# Patient Record
Sex: FEMALE | Race: WHITE | Employment: OTHER | URBAN - METROPOLITAN AREA
[De-identification: names, ages, dates, MRNs, and addresses within clinical notes are randomized per-mention and may not be internally consistent; named-entity substitution may affect disease eponyms.]

---

## 2018-08-23 ENCOUNTER — HOSPITAL ENCOUNTER (OUTPATIENT)
Dept: NON INVASIVE DIAGNOSTICS | Age: 83
Discharge: HOME | End: 2018-08-23
Payer: MEDICARE

## 2018-08-23 PROCEDURE — 93271 ECG/MONITORING AND ANALYSIS: CPT

## 2018-08-23 PROCEDURE — 93270 REMOTE 30 DAY ECG REV/REPORT: CPT

## 2018-08-27 NOTE — PROCEDURES
[address] [facility]                                   EVENT MONITOR    PATIENT NAME: Ritesh Martinez                     :        1925  MED REC NO:   [id number]                             ROOM:  ACCOUNT NO:   [de-identified]                           ADMIT DATE: 2018  PROVIDER:     Alessia WHITFIELD TYPE: EVENT MONITOR:  3 DAYS  INDICATION FOR STUDY:  ARRHYTHMIA    AUTHORIZING PROVIDER:  Rubi Ruffin MD  PRIMARY CARE PROVIDER:  Mark Lynch MD  INTERPRETING PHYSICIAN:  Mary Lr MD    Comments: The patient appeared to remain in sinus rhythm throughout recording with  sinus bradycardia and sinus tachycardia noted. Pacing rhythm noted. Rare  premature ventricular contractions with couplets were noted. Non-sustained  VENTRICULAR TACHYCARDIA was noted with the longest lasting 9 beats in  duration and the fastest peaking at 227 BPM.  Patient appeared to be  asymptomatic throughout recording. 1.  Sinus rhythm with sinus bradycardia and sinus tachycardia  2. Pacing rhythm  3.  Rare premature ventricular contractions with couplets  4.   [facility] MD GEORGE    D: 2018 14:51:34       T: 2018 14:59:27     CLAUDIA/GABE  Job#: [id number]     Doc#: Unknown

## 2018-08-27 NOTE — FLOWSHEET NOTE
Patient returned holter monitor. Data downloaded and sent to be scanned and read by cardiologist.  Monitor ID#[id number].